# Patient Record
(demographics unavailable — no encounter records)

---

## 2024-11-22 NOTE — REASON FOR VISIT
[TextEntry] : MENSES  MONTHLY.  C/O PROLONGED BLEEDING W OCPS AFTER MENSES;  SEEN BY PRIOR GYN AND JOHNATHANS NOEMÍONS OPINION.  WAS ON OCPS-  HAS BEEN ON VOLNEA FOR MANY YRS.  WAS GIVEN ANOTHER GENERIC BY PHARMACY.  HAD MISSED SOME PILLS AND TOOK DOUBLE LMP 9/29-10/2 FOLLOWED BY SPOTTING FROM 10/6-11 STARTED W HEAVY BLEEDING 10/12-19.  GIVEN PROVERA.  BLEEDING DID NOT STOP AND BECAME HEAVIER.  SONO DONE IN OFFICE AND WNL AS PER PT.  RESTARTED PRIOR OCP- VOLNEA.  OCPS CHANGED TO [facility] BUT HAS NOT STARTED NEW OCP.  BLEEDING STOPPED FOR APPROX 1 1/2WKS;  RESTARTED OCP AND BLEEDING RETURNED AND NOW STOPPED.  NEG UCG AT HOME C/O VAGINAL ODOR;  DENIES ITCHING OR IRRITATION

## 2025-04-11 NOTE — PLAN
[FreeTextEntry1] : CALL AFTER IMAGING TO DISCUSS  Patient screened for depression- no signs of clinical depression.  PHQ-9 scores reviewed over the course of the visit  5-10 minutes of face to face time spent.  Follow up with changes in mood including other symptoms of anxiety.

## 2025-04-11 NOTE — PHYSICAL EXAM
[Appropriately responsive] : appropriately responsive [Alert] : alert [No Acute Distress] : no acute distress [No Lymphadenopathy] : no lymphadenopathy [Soft] : soft [Non-tender] : non-tender [Non-distended] : non-distended [No HSM] : No HSM [No Lesions] : no lesions [No Mass] : no mass [Oriented x3] : oriented x3 [Examination Of The Breasts] : a normal appearance [No Masses] : no breast masses were palpable [Labia Majora] : normal [Labia Minora] : normal [Normal] : normal [Uterine Adnexae] : normal [TextEntry] : MOBILE PLAPABLE 1-2CM MASS AT ONE OCLOCK RIGHT BREAST

## 2025-04-11 NOTE — REASON FOR VISIT
[Annual] : an annual visit. [TextEntry] : ANNUAL EXAM NOTICED NONTENDER LUMP IN RIGHT BREAST 2 WKS AGO C/O VAGINAL ODOR;  DENIES VAG ITCHING OR IRRITATION SATISFIED W OCP PT WAS TOLD BY PRIOR GYN THAT SHE HAD OV CYST